# Patient Record
Sex: FEMALE | Race: WHITE
[De-identification: names, ages, dates, MRNs, and addresses within clinical notes are randomized per-mention and may not be internally consistent; named-entity substitution may affect disease eponyms.]

---

## 2018-05-14 ENCOUNTER — HOSPITAL ENCOUNTER (OUTPATIENT)
Dept: HOSPITAL 88 - RAD | Age: 68
End: 2018-05-14
Attending: INTERNAL MEDICINE
Payer: MEDICARE

## 2018-05-14 DIAGNOSIS — R07.9: Primary | ICD-10-CM

## 2018-05-14 PROCEDURE — 71046 X-RAY EXAM CHEST 2 VIEWS: CPT

## 2018-05-14 NOTE — DIAGNOSTIC IMAGING REPORT
PROCEDURE: X-RAY CHEST, TWO VIEWS

COMPARISON: 12/19/16.

INDICATIONS: RIGHT SIDED CHEST PAIN, SHORTNESS OF BREATH

 

FINDINGS:



LUNGS: Well-inflated without mass, infiltrate, or interstitial 

thickening. Vascular markings are normal.

 

PLEURA: No effusions or pneumothorax.

 

HEART \T\ 

MEDIASTINUM: The heart is within normal size-limits. Stable aortic 

ectasia.

 

BONES \T\

SOFT TISSUES: Mild degenerative changes of the spine are stable. No 

focal osseous lesions.

 

 

CONCLUSION:

 

Stable chest. No active disease. 

 

 

Dictated by:  Samantha Yan M.D. on 5/14/2018 at 16:43     

Electronically approved by:  Samantha Yan M.D. on 5/14/2018 at 

16:43

## 2023-03-20 LAB
ANION GAP SERPL CALC-SCNC: 17 MMOL/L (ref 8–16)
BASOPHILS # BLD AUTO: 0.1 10*3/UL (ref 0–0.1)
BASOPHILS NFR BLD AUTO: 0.6 % (ref 0–1)
BUN SERPL-MCNC: 13 MG/DL (ref 7–26)
BUN/CREAT SERPL: 20 (ref 6–25)
CALCIUM SERPL-MCNC: 9.1 MG/DL (ref 8.4–10.2)
CHLORIDE SERPL-SCNC: 104 MMOL/L (ref 98–107)
CO2 SERPL-SCNC: 24 MMOL/L (ref 22–29)
DEPRECATED APTT PLAS QN: 29.1 SECONDS (ref 23.8–35.5)
DEPRECATED INR PLAS: 0.91
DEPRECATED NEUTROPHILS # BLD AUTO: 5.8 10*3/UL (ref 2.1–6.9)
EOSINOPHIL # BLD AUTO: 0.3 10*3/UL (ref 0–0.4)
EOSINOPHIL NFR BLD AUTO: 3.7 % (ref 0–6)
ERYTHROCYTE [DISTWIDTH] IN CORD BLOOD: 16.8 % (ref 11.7–14.4)
GLUCOSE SERPLBLD-MCNC: 147 MG/DL (ref 74–118)
HCT VFR BLD AUTO: 32.5 % (ref 34.2–44.1)
HGB BLD-MCNC: 9.7 G/DL (ref 12–16)
LYMPHOCYTES # BLD: 1.4 10*3/UL (ref 1–3.2)
LYMPHOCYTES NFR BLD AUTO: 17.6 % (ref 18–39.1)
MCH RBC QN AUTO: 25.6 PG (ref 28–32)
MCHC RBC AUTO-ENTMCNC: 29.8 G/DL (ref 31–35)
MCV RBC AUTO: 85.8 FL (ref 81–99)
MONOCYTES # BLD AUTO: 0.4 10*3/UL (ref 0.2–0.8)
MONOCYTES NFR BLD AUTO: 4.9 % (ref 4.4–11.3)
NEUTS SEG NFR BLD AUTO: 73 % (ref 38.7–80)
PLATELET # BLD AUTO: 289 X10E3/UL (ref 140–360)
POTASSIUM SERPL-SCNC: 4 MMOL/L (ref 3.5–5.1)
PROTHROMBIN TIME: 12.8 SECONDS (ref 11.9–14.5)
RBC # BLD AUTO: 3.79 X10E6/UL (ref 3.6–5.1)
SODIUM SERPL-SCNC: 141 MMOL/L (ref 136–145)

## 2023-03-23 ENCOUNTER — HOSPITAL ENCOUNTER (OUTPATIENT)
Dept: HOSPITAL 88 - OR | Age: 73
Setting detail: OBSERVATION
LOS: 1 days | Discharge: HOME | End: 2023-03-24
Attending: NEUROLOGICAL SURGERY | Admitting: NEUROLOGICAL SURGERY
Payer: MEDICARE

## 2023-03-23 VITALS — DIASTOLIC BLOOD PRESSURE: 65 MMHG | SYSTOLIC BLOOD PRESSURE: 139 MMHG

## 2023-03-23 VITALS — HEIGHT: 64 IN | WEIGHT: 168 LBS | BODY MASS INDEX: 28.68 KG/M2

## 2023-03-23 VITALS — SYSTOLIC BLOOD PRESSURE: 123 MMHG | DIASTOLIC BLOOD PRESSURE: 68 MMHG

## 2023-03-23 VITALS — SYSTOLIC BLOOD PRESSURE: 139 MMHG | DIASTOLIC BLOOD PRESSURE: 65 MMHG

## 2023-03-23 VITALS — SYSTOLIC BLOOD PRESSURE: 137 MMHG | DIASTOLIC BLOOD PRESSURE: 77 MMHG

## 2023-03-23 DIAGNOSIS — E11.9: ICD-10-CM

## 2023-03-23 DIAGNOSIS — Z20.822: ICD-10-CM

## 2023-03-23 DIAGNOSIS — Z01.818: ICD-10-CM

## 2023-03-23 DIAGNOSIS — M81.0: ICD-10-CM

## 2023-03-23 DIAGNOSIS — M51.16: Primary | ICD-10-CM

## 2023-03-23 DIAGNOSIS — I10: ICD-10-CM

## 2023-03-23 PROCEDURE — 86880 COOMBS TEST DIRECT: CPT

## 2023-03-23 PROCEDURE — 88304 TISSUE EXAM BY PATHOLOGIST: CPT

## 2023-03-23 PROCEDURE — 85610 PROTHROMBIN TIME: CPT

## 2023-03-23 PROCEDURE — 72020 X-RAY EXAM OF SPINE 1 VIEW: CPT

## 2023-03-23 PROCEDURE — 86870 RBC ANTIBODY IDENTIFICATION: CPT

## 2023-03-23 PROCEDURE — 99001 SPECIMEN HANDLING PT-LAB: CPT

## 2023-03-23 PROCEDURE — 85730 THROMBOPLASTIN TIME PARTIAL: CPT

## 2023-03-23 PROCEDURE — 86905 BLOOD TYPING RBC ANTIGENS: CPT

## 2023-03-23 PROCEDURE — 88311 DECALCIFY TISSUE: CPT

## 2023-03-23 PROCEDURE — 82948 REAGENT STRIP/BLOOD GLUCOSE: CPT

## 2023-03-23 PROCEDURE — 97161 PT EVAL LOW COMPLEX 20 MIN: CPT

## 2023-03-23 PROCEDURE — 86850 RBC ANTIBODY SCREEN: CPT

## 2023-03-23 PROCEDURE — 36415 COLL VENOUS BLD VENIPUNCTURE: CPT

## 2023-03-23 PROCEDURE — 63030 LAMOT DCMPRN NRV RT 1 LMBR: CPT

## 2023-03-23 PROCEDURE — 86900 BLOOD TYPING SEROLOGIC ABO: CPT

## 2023-03-23 PROCEDURE — 71046 X-RAY EXAM CHEST 2 VIEWS: CPT

## 2023-03-23 PROCEDURE — 80048 BASIC METABOLIC PNL TOTAL CA: CPT

## 2023-03-23 PROCEDURE — 0223U NFCT DS 22 TRGT SARS-COV-2: CPT

## 2023-03-23 PROCEDURE — 85025 COMPLETE CBC W/AUTO DIFF WBC: CPT

## 2023-03-23 PROCEDURE — 97116 GAIT TRAINING THERAPY: CPT

## 2023-03-23 PROCEDURE — 93005 ELECTROCARDIOGRAM TRACING: CPT

## 2023-03-23 PROCEDURE — 97530 THERAPEUTIC ACTIVITIES: CPT

## 2023-03-23 RX ADMIN — OXYCODONE HYDROCHLORIDE AND ACETAMINOPHEN PRN EACH: 5; 325 TABLET ORAL at 18:31

## 2023-03-23 RX ADMIN — SODIUM CHLORIDE, POTASSIUM CHLORIDE, SODIUM LACTATE AND CALCIUM CHLORIDE SCH MLS/HR: 600; 310; 30; 20 INJECTION, SOLUTION INTRAVENOUS at 12:25

## 2023-03-23 RX ADMIN — SODIUM CHLORIDE PRN MG: 900 INJECTION INTRAVENOUS at 15:05

## 2023-03-23 RX ADMIN — SODIUM CHLORIDE PRN MG: 900 INJECTION INTRAVENOUS at 21:08

## 2023-03-23 RX ADMIN — OXYCODONE HYDROCHLORIDE AND ACETAMINOPHEN PRN EACH: 5; 325 TABLET ORAL at 14:02

## 2023-03-23 RX ADMIN — SODIUM CHLORIDE, POTASSIUM CHLORIDE, SODIUM LACTATE AND CALCIUM CHLORIDE SCH MLS/HR: 600; 310; 30; 20 INJECTION, SOLUTION INTRAVENOUS at 17:38

## 2023-03-24 VITALS — DIASTOLIC BLOOD PRESSURE: 63 MMHG | SYSTOLIC BLOOD PRESSURE: 108 MMHG

## 2023-03-24 VITALS — SYSTOLIC BLOOD PRESSURE: 134 MMHG | DIASTOLIC BLOOD PRESSURE: 77 MMHG

## 2023-03-24 VITALS — SYSTOLIC BLOOD PRESSURE: 109 MMHG | DIASTOLIC BLOOD PRESSURE: 56 MMHG

## 2023-03-24 VITALS — DIASTOLIC BLOOD PRESSURE: 54 MMHG | SYSTOLIC BLOOD PRESSURE: 126 MMHG

## 2023-03-24 RX ADMIN — OXYCODONE HYDROCHLORIDE AND ACETAMINOPHEN PRN EACH: 5; 325 TABLET ORAL at 02:11

## 2023-03-24 RX ADMIN — OXYCODONE HYDROCHLORIDE AND ACETAMINOPHEN PRN EACH: 5; 325 TABLET ORAL at 12:55

## 2023-03-24 RX ADMIN — SODIUM CHLORIDE, POTASSIUM CHLORIDE, SODIUM LACTATE AND CALCIUM CHLORIDE SCH MLS/HR: 600; 310; 30; 20 INJECTION, SOLUTION INTRAVENOUS at 08:40

## 2023-03-24 RX ADMIN — OXYCODONE HYDROCHLORIDE AND ACETAMINOPHEN PRN EACH: 5; 325 TABLET ORAL at 06:34

## 2023-03-24 RX ADMIN — SODIUM CHLORIDE PRN MG: 900 INJECTION INTRAVENOUS at 08:35

## 2023-03-24 RX ADMIN — Medication PRN MG: at 12:55

## 2023-03-24 RX ADMIN — SODIUM CHLORIDE, POTASSIUM CHLORIDE, SODIUM LACTATE AND CALCIUM CHLORIDE SCH MLS/HR: 600; 310; 30; 20 INJECTION, SOLUTION INTRAVENOUS at 00:22

## 2023-03-24 RX ADMIN — Medication PRN MG: at 08:35

## 2025-07-07 LAB
ACANTHOCYTES BLD QL SMEAR: (no result)
ANION GAP SERPL CALC-SCNC: 16.8 MMOL/L (ref 8–16)
ANISOCYTOSIS BLD QL SMEAR: (no result)
AUER BODIES BLD QL SMEAR: (no result)
BASO STIPL BLD QL SMEAR: (no result)
BASOPHILS # BLD AUTO: 0.1 10*3/UL (ref 0–0.1)
BASOPHILS NFR BLD AUTO: 0.7 % (ref 0–1)
BASOPHILS NFR SPEC MANUAL: (no result) % (ref 0–1.5)
BLASTS NFR BLD MANUAL: (no result) %
BUN SERPL-MCNC: 21 MG/DL (ref 7–26)
BUN/CREAT SERPL: 31 (ref 6–25)
BURR CELLS BLD QL SMEAR: (no result)
BURR CELLS BLD QL SMEAR: (no result)
CALCIUM SERPL-MCNC: 9.4 MG/DL (ref 8.4–10.2)
CHLORIDE SERPL-SCNC: 102 MMOL/L (ref 98–107)
CO2 SERPL-SCNC: 23 MMOL/L (ref 22–29)
CREAT SERPL-MCNC: 0.68 MG/DL (ref 0.57–1.11)
DACRYOCYTES BLD QL SMEAR: (no result)
DEPRECATED NEUTROPHILS # BLD AUTO: 6.1 10*3/UL (ref 2.1–6.9)
DOHLE BOD BLD QL SMEAR: (no result)
EGFRCR SERPLBLD CKD-EPI 2021: 91 ML/MIN (ref 60–?)
ELLIPTOCYTES BLD QL SMEAR: (no result)
EOSINOPHIL # BLD AUTO: 0.3 10*3/UL (ref 0–0.4)
EOSINOPHIL NFR BLD AUTO: 3.5 % (ref 0–6)
EOSINOPHIL NFR BLD MANUAL: (no result) % (ref 0–7)
ERYTHROCYTE [DISTWIDTH] IN CORD BLOOD: 17.8 % (ref 11.7–14.4)
GIANT PLATELETS BLD QL SMEAR: (no result)
GLUCOSE SERPLBLD-MCNC: 107 MG/DL (ref 74–118)
HCT VFR BLD AUTO: 32.6 % (ref 34.2–44.1)
HELMET CELLS BLD QL SMEAR: (no result)
HGB BLD-MCNC: 9.9 G/DL (ref 12–16)
HOWELL-JOLLY BOD BLD QL SMEAR: (no result)
HYPOCHROMIA BLD QL SMEAR: (no result)
LG PLATELETS BLD QL SMEAR: (no result)
LYMPHOCYTES # BLD: 1.5 10*3/UL (ref 1–3.2)
LYMPHOCYTES NFR BLD AUTO: 17.6 % (ref 18–39.1)
LYMPHOCYTES NFR BLD MANUAL: (no result) % (ref 19–48)
MACROCYTES BLD QL SMEAR: (no result)
MCH RBC QN AUTO: 25.9 PG (ref 28–32)
MCHC RBC AUTO-ENTMCNC: 30.4 G/DL (ref 31–35)
MCV RBC AUTO: 85.3 FL (ref 81–99)
METAMYELOCYTES NFR BLD MANUAL: (no result) % (ref 0–0)
MICROCYTES BLD QL SMEAR: (no result)
MONOCYTES # BLD AUTO: 0.4 10*3/UL (ref 0.2–0.8)
MONOCYTES NFR BLD AUTO: 4.7 % (ref 4.4–11.3)
MONOCYTES NFR BLD MANUAL: (no result) % (ref 3.4–9)
MYELOCYTES NFR BLD MANUAL: (no result) % (ref 0–0)
NEUTS BAND NFR BLD MANUAL: (no result) %
NEUTS HYPERSEG BLD QL SMEAR: (no result)
NEUTS SEG NFR BLD AUTO: 73.3 % (ref 38.7–80)
NEUTS SEG NFR BLD MANUAL: (no result) % (ref 40–74)
NEUTS VAC BLD QL SMEAR: (no result)
NRBC # BLD: (no result) 10*3/UL
NRBC/RBC NFR BLD MANUAL: (no result) %
OVALOCYTES BLD QL SMEAR: (no result)
PAPPENHEIMER BOD BLD QL SMEAR: (no result)
PLASMA CELLS NFR BLD: (no result) %
PLAT MORPH BLD: (no result)
PLATELET # BLD AUTO: 299 X10E3/UL (ref 140–360)
PLATELET # BLD EST: (no result) 10*3/UL
PLATELET CLUMP BLD QL SMEAR: (no result)
POIKILOCYTOSIS BLD QL SMEAR: (no result)
POLYCHROMASIA BLD QL SMEAR: (no result)
POTASSIUM SERPL-SCNC: 3.8 MMOL/L (ref 3.5–5.1)
PROMYELOCYTES NFR BLD MANUAL: (no result) % (ref 0–0)
RBC # BLD AUTO: 3.82 X10E6/UL (ref 3.6–5.1)
RBC MORPH BLD: (no result)
ROULEAUX BLD QL SMEAR: (no result)
SCHISTOCYTES BLD QL SMEAR: (no result)
SICKLE CELLS BLD QL SMEAR: (no result)
SMUDGE CELLS BLD QL SMEAR: (no result)
SODIUM SERPL-SCNC: 138 MMOL/L (ref 136–145)
SPHEROCYTES BLD QL SMEAR: (no result)
STOMATOCYTES BLD QL SMEAR: (no result)
TARGETS BLD QL SMEAR: (no result)
TOXIC GRANULES BLD QL SMEAR: (no result)
VARIANT LYMPHS NFR BLD: (no result) %
WBC # BLD: 8.34 X10E3/UL (ref 4.8–10.8)
WBC NRBC COR # BLD: (no result) 10*3/UL

## 2025-07-11 ENCOUNTER — HOSPITAL ENCOUNTER (OUTPATIENT)
Dept: HOSPITAL 88 - OR | Age: 75
Discharge: HOME | End: 2025-07-11
Attending: UROLOGY
Payer: MEDICARE

## 2025-07-11 VITALS — TEMPERATURE: 98.1 F

## 2025-07-11 VITALS
RESPIRATION RATE: 18 BRPM | OXYGEN SATURATION: 96 % | SYSTOLIC BLOOD PRESSURE: 142 MMHG | HEART RATE: 83 BPM | DIASTOLIC BLOOD PRESSURE: 85 MMHG

## 2025-07-11 DIAGNOSIS — I10: ICD-10-CM

## 2025-07-11 DIAGNOSIS — E11.9: ICD-10-CM

## 2025-07-11 DIAGNOSIS — N32.81: ICD-10-CM

## 2025-07-11 DIAGNOSIS — N95.2: ICD-10-CM

## 2025-07-11 DIAGNOSIS — Z01.812: ICD-10-CM

## 2025-07-11 DIAGNOSIS — N81.10: ICD-10-CM

## 2025-07-11 DIAGNOSIS — K58.9: ICD-10-CM

## 2025-07-11 DIAGNOSIS — Z01.818: ICD-10-CM

## 2025-07-11 DIAGNOSIS — R39.14: ICD-10-CM

## 2025-07-11 DIAGNOSIS — Z01.810: ICD-10-CM

## 2025-07-11 DIAGNOSIS — N30.10: Primary | ICD-10-CM

## 2025-07-11 DIAGNOSIS — K21.9: ICD-10-CM

## 2025-07-11 DIAGNOSIS — N39.46: ICD-10-CM

## 2025-07-11 DIAGNOSIS — N36.41: ICD-10-CM

## 2025-07-11 DIAGNOSIS — Z79.899: ICD-10-CM

## 2025-07-11 DIAGNOSIS — Z79.84: ICD-10-CM

## 2025-07-11 DIAGNOSIS — N81.6: ICD-10-CM

## 2025-07-11 PROCEDURE — 71046 X-RAY EXAM CHEST 2 VIEWS: CPT

## 2025-07-11 PROCEDURE — 36415 COLL VENOUS BLD VENIPUNCTURE: CPT

## 2025-07-11 PROCEDURE — 74420 UROGRAPHY RTRGR +-KUB: CPT

## 2025-07-11 PROCEDURE — 82948 REAGENT STRIP/BLOOD GLUCOSE: CPT

## 2025-07-11 PROCEDURE — 87086 URINE CULTURE/COLONY COUNT: CPT

## 2025-07-11 PROCEDURE — 85025 COMPLETE CBC W/AUTO DIFF WBC: CPT

## 2025-07-11 PROCEDURE — 52260 CYSTOSCOPY AND TREATMENT: CPT

## 2025-07-11 PROCEDURE — 93005 ELECTROCARDIOGRAM TRACING: CPT

## 2025-07-11 PROCEDURE — 80048 BASIC METABOLIC PNL TOTAL CA: CPT

## 2025-07-11 RX ADMIN — Medication ONE MG: at 14:20
